# Patient Record
Sex: MALE | Race: WHITE | NOT HISPANIC OR LATINO | Employment: FULL TIME | URBAN - METROPOLITAN AREA
[De-identification: names, ages, dates, MRNs, and addresses within clinical notes are randomized per-mention and may not be internally consistent; named-entity substitution may affect disease eponyms.]

---

## 2024-04-27 ENCOUNTER — OCCUPATIONAL MEDICINE (OUTPATIENT)
Dept: URGENT CARE | Facility: PHYSICIAN GROUP | Age: 24
End: 2024-04-27
Payer: COMMERCIAL

## 2024-04-27 VITALS
TEMPERATURE: 99.1 F | SYSTOLIC BLOOD PRESSURE: 118 MMHG | HEIGHT: 67 IN | BODY MASS INDEX: 18.83 KG/M2 | WEIGHT: 120 LBS | DIASTOLIC BLOOD PRESSURE: 68 MMHG | HEART RATE: 72 BPM | RESPIRATION RATE: 17 BRPM | OXYGEN SATURATION: 100 %

## 2024-04-27 DIAGNOSIS — S46.811A STRAIN OF RIGHT TRAPEZIUS MUSCLE, INITIAL ENCOUNTER: ICD-10-CM

## 2024-04-27 PROCEDURE — 3074F SYST BP LT 130 MM HG: CPT | Performed by: FAMILY MEDICINE

## 2024-04-27 PROCEDURE — 99203 OFFICE O/P NEW LOW 30 MIN: CPT | Performed by: FAMILY MEDICINE

## 2024-04-27 PROCEDURE — 3078F DIAST BP <80 MM HG: CPT | Performed by: FAMILY MEDICINE

## 2024-04-27 ASSESSMENT — ENCOUNTER SYMPTOMS: FEVER: 0

## 2024-04-27 NOTE — PROGRESS NOTES
"Subjective:     Tristan Cardenas is a 24 y.o. male who presents for Work-Related Injury (New Workers Comp, 04/23/2024, R shoulder injury )    HPI  Pt presents for evaluation of an acute problem  DOI: 4/23/2024  WENDY: Patient lifting a box full of parts and felt shoulder pop causing immediate pain  Pt had immediate pain and slowly worsened throughout the rest of the shift   Had evaluation in [facility] ER, and had negative x-rays   Symptoms have been getting better since onset  Pain is still mostly in the anterior and superior shoulder  Pain is worse with abduction  No neck pain  No numbness or tingling  No past injuries to this shoulder    Review of Systems   Constitutional:  Negative for fever.   Skin:  Negative for rash.     PMH: Past medical history reviewed in Epic  MEDS: Medications were reviewed in Epic  ALLERGIES: Allergies were reviewed in Epic     Objective:   /68   Pulse 72   Temp 37.3 °C (99.1 °F) (Temporal)   Resp 17   Ht 1.702 m (5' 7\")   Wt 54.4 kg (120 lb)   SpO2 100%   BMI 18.79 kg/m²     Physical Exam  Constitutional:       General: He is not in acute distress.     Appearance: He is well-developed. He is not diaphoretic.   Pulmonary:      Effort: Pulmonary effort is normal.   Neurological:      Mental Status: He is alert.     Right shoulder  General: no gross deformity  ROM: flex 170, ER 60, IR equal  Palpation: TTP focally along the superior trapezius  Strength: 5/5 abduction, 5/5 ER, 5/5 IR  Rotator Cuff: Empty can -, External rotation lag -  Impingement: Valencia -  Biceps: Speed’s -  AC Joint: Cross body -  Stability: Apprehension -  Neuro: Sensation intact    Assessment/Plan:   Assessment    1. Strain of right trapezius muscle, initial encounter    Patient with strain of the right trapezius.  Overall retains good strength and range of motion.  No sign of large rotator cuff tear.  Recommended starting home exercise program and a handout was given.  Given the 39 with work " restrictions.  Already was given C4 from ER.  Plan to follow-up in about a week to monitor his progress.

## 2024-04-27 NOTE — LETTER
PHYSICIAN’S AND CHIROPRACTIC PHYSICIAN'S                       PROGRESS REPORT  CERTIFICATION OF DISABILITY Claim Number:        Social Security Number:     Patient’s Name:Tristan Cardenas Date of Injury:  4/23/2024     Employer:  [employer]  Name of MCO (if applicable)   Patient’s Job Description/Occupation:      Previous Injuries/Diseases/Surgeries Contributing to the Condition:      Diagnosis:  (S40.600O) Strain of right trapezius muscle, initial encounterThe encounter diagnosis was Strain of right trapezius muscle, initial encounter.   Related to the Industrial Injury? Yes      Objective Medical Findings: Right shoulder  General: no gross deformity  ROM: flex 170, ER 60, IR equal  Palpation: TTP focally along the superior trapezius  Strength: 5/5 abduction, 5/5 ER, 5/5 IR  Rotator Cuff: Empty can -, External rotation lag -  Impingement: Valencia -  Biceps: Speed's -  AC Joint: Cross body -  Stability: Apprehension -  Neuro: Sensation intact    []  None - Discharged                         Stable     [x]  Yes     []  No                           Ratable     []  Yes     []  No   []  Generally Improved                        []  Condition Worsened                              []  Condition Same         May Have Suffered a Permanent Disability     []  Yes     []  No   Treatment Plan: Given home exercise program to start working on, use heat, activity modification, and should make great improvements over the next 1 to 2 weeks     [] No Change in Therapy                    [] PT/OT Prescribed                   [] Medication May be Used While Working    [] Case Management                          [] PT/OT Discontinued  []  Consultation    [] Further Diagnostic Studies    []  Prescription(s)                        [] Released to FULL DUTY /No Restrictions on (Date):                                                                                           [] Certified  TOTALLY TEMPORARILY DISABLED (Indicate Dates): From:                       To:                               [x] Released to RESTRICTED/Modified Duty on (Date): From:   4/27/24              To:    5/4/24                                          Restrictions Are:     []  Permanent[x]  Temporary   [] No Sitting                   []  No Standing          []  No Pulling             []  Other:                                              [] No Bending at Waist  []  No Stooping          []  No Lifting                                                                            [] No Carrying                []  No Walking           []  Lifting Restricted to (lbs.):   [] No Pushing                 []  No Climbing         []  No Reaching Above Shoulders    Limit lifting/pushing/pulling with right upper extremity to 5 pounds  May not reach above right shoulder  May use left upper extremity without restriction   Date of Next Visit: 5/4/2024 Date of this Exam:  4/27/2024 Physician/Chiropractic Physician Name: Zain Guevara M.D. Physician/Chiropractic Physician Signature:   Michael Quiroz DO MPH   D-39 (Rev. 2/24)

## 2024-05-04 ENCOUNTER — OCCUPATIONAL MEDICINE (OUTPATIENT)
Dept: URGENT CARE | Facility: PHYSICIAN GROUP | Age: 24
End: 2024-05-04
Payer: COMMERCIAL

## 2024-05-04 VITALS
HEIGHT: 68 IN | DIASTOLIC BLOOD PRESSURE: 78 MMHG | HEART RATE: 61 BPM | WEIGHT: 121.69 LBS | RESPIRATION RATE: 15 BRPM | TEMPERATURE: 99 F | SYSTOLIC BLOOD PRESSURE: 122 MMHG | OXYGEN SATURATION: 97 % | BODY MASS INDEX: 18.44 KG/M2

## 2024-05-04 DIAGNOSIS — S46.811A STRAIN OF RIGHT TRAPEZIUS MUSCLE, INITIAL ENCOUNTER: ICD-10-CM

## 2024-05-04 PROCEDURE — 3074F SYST BP LT 130 MM HG: CPT | Performed by: NURSE PRACTITIONER

## 2024-05-04 PROCEDURE — 3078F DIAST BP <80 MM HG: CPT | Performed by: NURSE PRACTITIONER

## 2024-05-04 PROCEDURE — 99213 OFFICE O/P EST LOW 20 MIN: CPT | Performed by: NURSE PRACTITIONER

## 2024-05-04 ASSESSMENT — ENCOUNTER SYMPTOMS
BRUISES/BLEEDS EASILY: 0
BACK PAIN: 0
TINGLING: 0
MYALGIAS: 1
SENSORY CHANGE: 0
FALLS: 0
NECK PAIN: 0

## 2024-05-04 NOTE — LETTER
PHYSICIAN’S AND CHIROPRACTIC PHYSICIAN'S                       PROGRESS REPORT  CERTIFICATION OF DISABILITY Claim Number:        Social Security Number:     Patient’s Name:Tristan Cardenas Date of Injury:  4/23/2024     Employer:  [employer] Name of MCO (if applicable)   Patient’s Job Description/Occupation:      Previous Injuries/Diseases/Surgeries Contributing to the Condition:   None noted   Diagnosis:  (S43.060B) Strain of right trapezius muscle, initial encounterThe encounter diagnosis was Strain of right trapezius muscle, initial encounter.    Related to the Industrial Injury?     DOI: 4/23/2024  WENDY: Patient lifting a box full of parts and felt shoulder pop causing immediate pain  Pt had immediate pain and slowly worsened throughout the rest of the shift   Had evaluation in [facility] ER, and had negative x-rays   Symptoms have been getting better since onset  Pain is still mostly in the anterior and superior shoulder  Pain is worse with abduction  No neck pain  No numbness or tingling  No past injuries to this shoulder   Objective Medical Findings:  Vitals WNL.  Able to lift arm above shoulder without difficulty as well as push pull motion.  Discomfort with these movements to anterior aspect of right shoulder joint.  No swelling or deformity of the shoulder joint.  Tenderness on palpation of anterior right shoulder joint.  No tenderness on palpation of right trapezius muscle or indication for muscle spasm.    []  None - Discharged                         Stable     [x]  Yes     []  No                           Ratable     []  Yes     []  No   [x]  Generally Improved                        []  Condition Worsened                              []  Condition Same         May Have Suffered a Permanent Disability     []  Yes     [x]  No   Treatment Plan: -May continue with ibuprofen as needed for pain  -May continue with heat application to prevent joint  stiffness  -May perform exercises as instructed at last visit     [] No Change in Therapy                    [] PT/OT Prescribed                   [] Medication May be Used While Working    [] Case Management                          [] PT/OT Discontinued  []  Consultation    [] Further Diagnostic Studies    []  Prescription(s)                        [] Released to FULL DUTY /No Restrictions on (Date):                                                                                           [] Certified TOTALLY TEMPORARILY DISABLED (Indicate Dates): From:                       To:                               [x] Released to RESTRICTED/Modified Duty on (Date): From:      5/4/2024                        To:                    5/11/2024                                               Restrictions Are:     []  Permanent[x]  Temporary   [] No Sitting                   []  No Standing          []  No Pulling             []  Other:                                              [] No Bending at Waist  []  No Stooping          []  No Lifting                                                                            [] No Carrying                []  No Walking           []  Lifting Restricted to (lbs.): 5  [] No Pushing                 []  No Climbing         []  No Reaching Above Shoulders  Limit lifting/pushing/pulling with right upper extremity to 5 pounds  May not reach above right shoulder  May use left upper extremity without restriction       Date of Next Visit:   Date of this Exam:  5/4/2024 Physician/Chiropractic Physician Name: BENNETT Sharma Physician/Chiropractic Physician Signature:   Michael Quiroz DO MPH   D-39 (Rev. 2/24)

## 2024-05-04 NOTE — PROGRESS NOTES
"Subjective     Tristan Ralph Cardenas is a 24 y.o. male who presents with Follow-Up (R Shoulder sharp pain )            HPI  DOI: 4/23/2024. INITIAL VISIT NOTE:    WENDY: Patient lifting a box full of parts and felt shoulder pop causing immediate pain  Pt had immediate pain and slowly worsened throughout the rest of the shift   Had evaluation in [facility] ER, and had negative x-rays   Symptoms have been getting better since onset  Pain is still mostly in the anterior and superior shoulder  Pain is worse with abduction  No neck pain  No numbness or tingling  No past injuries to this shoulder    Today, 5/4/2024, 2nd encounter.  States range of motion has improved but does get discomfort to the anterior right shoulder joint.  Has been applying heat and taking ibuprofen twice daily as needed.  States has been performing range of motion exercises that was given to him at last visit.    PMH: No pertinent past medical history to this problem  MEDS: Medications were reviewed in Epic  ALLERGIES: Allergies were reviewed in Epic  FH: No pertinent family history to this problem         Review of Systems   Musculoskeletal:  Positive for joint pain and myalgias. Negative for back pain, falls and neck pain.   Neurological:  Negative for tingling and sensory change.   Endo/Heme/Allergies:  Does not bruise/bleed easily.   All other systems reviewed and are negative.             Objective     /78 (BP Location: Right arm, Patient Position: Sitting, BP Cuff Size: Adult)   Pulse 61   Temp 37.2 °C (99 °F) (Temporal)   Resp 15   Ht 1.727 m (5' 8\")   Wt 55.2 kg (121 lb 11.1 oz)   SpO2 97%   BMI 18.50 kg/m²      Physical Exam  Vitals reviewed.   Constitutional:       General: He is awake. He is not in acute distress.     Appearance: Normal appearance. He is not ill-appearing, toxic-appearing or diaphoretic.   Cardiovascular:      Rate and Rhythm: Normal rate.   Pulmonary:      Effort: Pulmonary effort is normal. "   Musculoskeletal:      Right shoulder: Tenderness present. No swelling, deformity, effusion, laceration, bony tenderness or crepitus. Normal range of motion. Normal strength. Normal pulse.   Skin:     General: Skin is warm and dry.   Neurological:      Mental Status: He is alert.   Psychiatric:         Attention and Perception: Attention normal.         Mood and Affect: Mood normal.         Speech: Speech normal.         Behavior: Behavior normal. Behavior is cooperative.                  Vitals WNL.  Able to lift arm above shoulder without difficulty as well as push pull motion.  Discomfort with these movements to anterior aspect of right shoulder joint.  No swelling or deformity of the shoulder joint.  Tenderness on palpation of anterior right shoulder joint.  No tenderness on palpation of right trapezius muscle or indication for muscle spasm.           Assessment & Plan        1. Strain of right trapezius muscle, initial encounter    -May continue with ibuprofen as needed for pain  -May continue with heat application to prevent joint stiffness  -May perform exercises as instructed at last visit

## 2024-05-11 ENCOUNTER — OCCUPATIONAL MEDICINE (OUTPATIENT)
Dept: URGENT CARE | Facility: PHYSICIAN GROUP | Age: 24
End: 2024-05-11
Payer: COMMERCIAL

## 2024-05-11 VITALS
SYSTOLIC BLOOD PRESSURE: 112 MMHG | WEIGHT: 111.4 LBS | DIASTOLIC BLOOD PRESSURE: 64 MMHG | HEIGHT: 65 IN | RESPIRATION RATE: 14 BRPM | BODY MASS INDEX: 18.56 KG/M2 | OXYGEN SATURATION: 96 % | TEMPERATURE: 98.9 F | HEART RATE: 66 BPM

## 2024-05-11 DIAGNOSIS — S46.811D STRAIN OF TRAPEZIUS MUSCLE, RIGHT, SUBSEQUENT ENCOUNTER: ICD-10-CM

## 2024-05-11 PROCEDURE — 99213 OFFICE O/P EST LOW 20 MIN: CPT | Performed by: NURSE PRACTITIONER

## 2024-05-11 PROCEDURE — 3074F SYST BP LT 130 MM HG: CPT | Performed by: NURSE PRACTITIONER

## 2024-05-11 PROCEDURE — 3078F DIAST BP <80 MM HG: CPT | Performed by: NURSE PRACTITIONER

## 2024-05-11 NOTE — PROGRESS NOTES
Subjective:     Tristan Cardenas is a 24 y.o. male who presents for Follow-Up (WC, Right shoulder )      HPI  HPI  Pt presents for evaluation of an acute problem  DOI: 4/23/2024  WENDY: Patient lifting a box full of parts and felt shoulder pop causing immediate pain  Pt had immediate pain and slowly worsened throughout the rest of the shift   Had evaluation in [facility] ER, and had negative x-rays   Symptoms have been getting better since onset  Pain is still mostly in the anterior and superior shoulder  Pain is worse with abduction  No neck pain  No numbness or tingling  No past injuries to this shoulder    FV #1: Today, 5/4/2024, 2nd encounter.  States range of motion has improved but does get discomfort to the anterior right shoulder joint.  Has been applying heat and taking ibuprofen twice daily as needed.  States has been performing range of motion exercises that was given to him at last visit.     FV#2: Patient notes that pain has progressively improved.  He believes that he is approximately 75 to 80% better since initial visit.  Range of motion is intact.  He does complain of a pinpoint pressure to anterior shoulder.  He states that this feels as though a pencil is slightly poking him.  He also endorses pain of his trapezius with lateral movements of right upper extremity.  Negative for numbness or tingling.  Patient has not been back to work due to work restrictions.  Patient does continue to use ibuprofen however, he notes that this is rarely used as his pain is improving.    ROS    PMH: History reviewed. No pertinent past medical history.  ALLERGIES: No Known Allergies  SURGHX: History reviewed. No pertinent surgical history.  SOCHX:   Social History     Socioeconomic History    Marital status: Single   Tobacco Use    Smoking status: Never    Smokeless tobacco: Never   Vaping Use    Vaping Use: Every day   Substance and Sexual Activity    Alcohol use: Yes     Comment: weekends    Drug use: Yes      "Types: Marijuana     FH: History reviewed. No pertinent family history.      Objective:   /64   Pulse 66   Temp 37.2 °C (98.9 °F) (Temporal)   Resp 14   Ht 1.651 m (5' 5\")   Wt 50.5 kg (111 lb 6.4 oz)   SpO2 96%   BMI 18.54 kg/m²     Physical Exam  Vitals and nursing note reviewed.   Constitutional:       General: He is not in acute distress.     Appearance: Normal appearance. He is normal weight. He is not ill-appearing or toxic-appearing.   HENT:      Head: Normocephalic.      Right Ear: External ear normal.      Left Ear: External ear normal.      Nose: Nose normal.      Mouth/Throat:      Mouth: Mucous membranes are moist.   Eyes:      General:         Right eye: No discharge.         Left eye: No discharge.      Extraocular Movements: Extraocular movements intact.      Conjunctiva/sclera: Conjunctivae normal.      Pupils: Pupils are equal, round, and reactive to light.   Pulmonary:      Effort: Pulmonary effort is normal.      Breath sounds: Normal breath sounds.   Abdominal:      General: Abdomen is flat.   Musculoskeletal:         General: Normal range of motion.      Cervical back: Normal range of motion and neck supple. No rigidity.   Lymphadenopathy:      Cervical: No cervical adenopathy.   Skin:     General: Skin is warm and dry.   Neurological:      General: No focal deficit present.      Mental Status: He is alert and oriented to person, place, and time. Mental status is at baseline.   Psychiatric:         Mood and Affect: Mood normal.         Behavior: Behavior normal.         Judgment: Judgment normal.         Assessment/Plan:   Assessment    1. Strain of trapezius muscle, right, subsequent encounter          Patient to continue with gentle range of motion exercises and use of ibuprofen as needed.  Will decrease work restrictions to 10 pounds weight limit.  I do expect discharge at next visit in 7 days.    "

## 2024-05-18 ENCOUNTER — OCCUPATIONAL MEDICINE (OUTPATIENT)
Dept: URGENT CARE | Facility: PHYSICIAN GROUP | Age: 24
End: 2024-05-18
Payer: COMMERCIAL

## 2024-05-18 VITALS
HEIGHT: 65 IN | DIASTOLIC BLOOD PRESSURE: 70 MMHG | SYSTOLIC BLOOD PRESSURE: 122 MMHG | WEIGHT: 111 LBS | TEMPERATURE: 98.8 F | OXYGEN SATURATION: 97 % | BODY MASS INDEX: 18.49 KG/M2 | RESPIRATION RATE: 14 BRPM | HEART RATE: 70 BPM

## 2024-05-18 DIAGNOSIS — S46.811D STRAIN OF TRAPEZIUS MUSCLE, RIGHT, SUBSEQUENT ENCOUNTER: ICD-10-CM

## 2024-05-18 PROCEDURE — 99213 OFFICE O/P EST LOW 20 MIN: CPT | Performed by: PHYSICIAN ASSISTANT

## 2024-05-18 PROCEDURE — 3074F SYST BP LT 130 MM HG: CPT | Performed by: PHYSICIAN ASSISTANT

## 2024-05-18 PROCEDURE — 3078F DIAST BP <80 MM HG: CPT | Performed by: PHYSICIAN ASSISTANT

## 2024-05-18 NOTE — PROGRESS NOTES
"Subjective:     Tristan Cardenas is a 24 y.o. male who presents for Follow-Up (WC, Right shoulder )      DOI: 4/23/2024  WENDY: Patient lifting a box full of parts and felt shoulder pop causing immediate pain    Third follow-up visit:  He does feel 100% improved.  He is ready to be charged MMI at this time    PMH:   No pertinent past medical history to this problem  MEDS:  Medications were reviewed in EMR  ALLERGIES:  Allergies were reviewed in EMR  SOCHX:  Works as a   FH:   No pertinent family history to this problem       Objective:     /70   Pulse 70   Temp 37.1 °C (98.8 °F) (Temporal)   Resp 14   Ht 1.651 m (5' 5\")   Wt 50.3 kg (111 lb)   SpO2 97%   BMI 18.47 kg/m²     Exam of the right shoulder is negative for bony or soft tissue tenderness.  Right shoulder range of motion and strength remains intact and comparable bilaterally    Assessment/Plan:     Encounter Diagnoses   Name Primary?    Strain of trapezius muscle, right, subsequent encounter          Plan:  Discharge MMI    Differential diagnosis, natural history, supportive care, and indications for immediate follow-up discussed.    Niranjan Pruitt PA-C    "